# Patient Record
Sex: MALE | Race: OTHER | NOT HISPANIC OR LATINO | URBAN - METROPOLITAN AREA
[De-identification: names, ages, dates, MRNs, and addresses within clinical notes are randomized per-mention and may not be internally consistent; named-entity substitution may affect disease eponyms.]

---

## 2019-02-20 ENCOUNTER — EMERGENCY (EMERGENCY)
Facility: HOSPITAL | Age: 59
LOS: 1 days | Discharge: HOME | End: 2019-02-20
Attending: STUDENT IN AN ORGANIZED HEALTH CARE EDUCATION/TRAINING PROGRAM
Payer: COMMERCIAL

## 2019-02-20 VITALS
OXYGEN SATURATION: 96 % | DIASTOLIC BLOOD PRESSURE: 87 MMHG | HEART RATE: 65 BPM | RESPIRATION RATE: 18 BRPM | TEMPERATURE: 99 F | SYSTOLIC BLOOD PRESSURE: 163 MMHG

## 2019-02-20 VITALS — HEIGHT: 68 IN | WEIGHT: 154.98 LBS

## 2019-02-20 PROCEDURE — 93010 ELECTROCARDIOGRAM REPORT: CPT

## 2019-02-20 PROCEDURE — 99284 EMERGENCY DEPT VISIT MOD MDM: CPT

## 2019-02-20 PROCEDURE — 93005 ELECTROCARDIOGRAM TRACING: CPT

## 2019-02-20 PROCEDURE — 99285 EMERGENCY DEPT VISIT HI MDM: CPT

## 2019-02-20 NOTE — ED PROVIDER NOTE - CLINICAL SUMMARY MEDICAL DECISION MAKING FREE TEXT BOX
Pt brought in to be evaluated for ability to be confined.  Pt is medically fit for confinement at this time and is to continue current medical regimen.

## 2019-02-20 NOTE — ED PROVIDER NOTE - NS ED ROS FT
No fever/chills, No photophobia/eye pain/changes in vision, No ear pain/sore throat/dysphagia, No chest pain/palpitations, no SOB/cough/wheeze/stridor, No abdominal pain, No N/V +diarrhea, no dysuria/frequency/discharge, No neck/back pain, no rash, no changes in neurological status/function.

## 2019-02-20 NOTE — ED ADULT NURSE NOTE - CHIEF COMPLAINT QUOTE
comes to ed in custody with [employer]. patient has pacemaker and needs to be transported to facility in [place]; brought here because he has a pacemaker for fit for confinement. patient has no medical complaints. pt hiv +

## 2019-02-20 NOTE — ED ADULT TRIAGE NOTE - CHIEF COMPLAINT QUOTE
comes to ed in custody with [employer]. patient has pacemaker and needs to be transported to facility in [place]; brought here because he has a pacemaker for fit for confinement. patient has no medical complaints. pt hiv +

## 2019-02-20 NOTE — ED PROVIDER NOTE - OBJECTIVE STATEMENT
Pt is a 57 yo M brought in by [facility] for medical evaluation. PMHx significant for HIV (on medications, last CD4 normal and viral load undetectable), htn, cardiomyopathy and ppm. Pt is to be going to assisted today with the [facility] and was brought in for medical evaluation given his hx of multiple medical problems. Pt has no complaints currently aside from some mild chronic diarrhea which is unchanged. no cp. no sob. no n/v. no other complaints.

## 2019-05-21 ENCOUNTER — APPOINTMENT (OUTPATIENT)
Dept: HEART AND VASCULAR | Facility: CLINIC | Age: 59
End: 2019-05-21

## 2019-08-16 ENCOUNTER — APPOINTMENT (OUTPATIENT)
Dept: HEART AND VASCULAR | Facility: CLINIC | Age: 59
End: 2019-08-16
Payer: COMMERCIAL

## 2019-08-16 ENCOUNTER — APPOINTMENT (OUTPATIENT)
Dept: HEART AND VASCULAR | Facility: CLINIC | Age: 59
End: 2019-08-16

## 2019-08-16 VITALS
HEIGHT: 69 IN | SYSTOLIC BLOOD PRESSURE: 159 MMHG | BODY MASS INDEX: 22.51 KG/M2 | WEIGHT: 152 LBS | DIASTOLIC BLOOD PRESSURE: 83 MMHG | HEART RATE: 57 BPM

## 2019-08-16 DIAGNOSIS — I25.10 ATHEROSCLEROTIC HEART DISEASE OF NATIVE CORONARY ARTERY W/OUT ANGINA PECTORIS: ICD-10-CM

## 2019-08-16 DIAGNOSIS — Z45.02 ENCOUNTER FOR ADJUSTMENT AND MANAGEMENT OF AUTOMATIC IMPLANTABLE CARDIAC DEFIBRILLATOR: ICD-10-CM

## 2019-08-16 PROCEDURE — 93282 PRGRMG EVAL IMPLANTABLE DFB: CPT

## 2019-08-28 RX ORDER — RALTEGRAVIR 400 MG/1
TABLET, FILM COATED ORAL
Refills: 0 | Status: ACTIVE | COMMUNITY

## 2019-08-28 NOTE — PHYSICAL EXAM
[General Appearance - Well Developed] : well developed [Well Groomed] : well groomed [General Appearance - Well Nourished] : well nourished [Heart Rate And Rhythm] : heart rate and rhythm were normal [Heart Sounds] : normal S1 and S2 [Murmurs] : no murmurs present [Arterial Pulses Normal] : the arterial pulses were normal [Edema] : no peripheral edema present [Respiration, Rhythm And Depth] : normal respiratory rhythm and effort [Auscultation Breath Sounds / Voice Sounds] : lungs were clear to auscultation bilaterally [Left Infraclavicular] : left infraclavicular area [Clean] : clean [Dry] : dry [Well-Healed] : well-healed [Bowel Sounds] : normal bowel sounds [Abdomen Soft] : soft [Nail Clubbing] : no clubbing of the fingernails [Cyanosis, Localized] : no localized cyanosis [Normal Conjunctiva] : the conjunctiva exhibited no abnormalities [Normal Oral Mucosa] : normal oral mucosa [Normal Oropharynx] : normal oropharynx [Normal Jugular Venous V Waves Present] : normal jugular venous V waves present [Abnormal Walk] : normal gait [Skin Color & Pigmentation] : normal skin color and pigmentation [Skin Turgor] : normal skin turgor [] : no rash [Oriented To Time, Place, And Person] : oriented to person, place, and time [Impaired Insight] : insight and judgment were intact [No Anxiety] : not feeling anxious

## 2019-08-28 NOTE — PROCEDURE
[ICD] : Implantable cardioverter-defibrillator [VVI] : VVI [Medtronic] : Medtronic [Voltage: ___ volts] : Voltage was [unfilled] volts [Lead Imp:  ___ohms] : lead impedance was [unfilled] ohms [Sensing Amplitude ___mv] : sensing amplitude was [unfilled] mv [___V @] : [unfilled] V [___ ms] : [unfilled] ms [Sense ___ %] : Sense [unfilled]% [de-identified] : Mireya MARY VR [id number] [de-identified] : [id number] [de-identified] : 09/01/2011 [de-identified] : 42 [de-identified] : RRT since June 28th 2019 [de-identified] : NO VT / VF

## 2019-08-28 NOTE — REASON FOR VISIT
[Consultation] : a consultation regarding [AICD Check] : implantable cardioverter-defibrillator [FreeTextEntry1] : ICD evaluation

## 2019-09-09 ENCOUNTER — INPATIENT (INPATIENT)
Facility: HOSPITAL | Age: 59
LOS: 0 days | Discharge: HOME | DRG: 245 | End: 2019-09-10
Attending: INTERNAL MEDICINE | Admitting: INTERNAL MEDICINE
Payer: MEDICAID

## 2019-09-09 VITALS — HEART RATE: 60 BPM | SYSTOLIC BLOOD PRESSURE: 173 MMHG | RESPIRATION RATE: 16 BRPM | DIASTOLIC BLOOD PRESSURE: 90 MMHG

## 2019-09-09 DIAGNOSIS — I25.5 ISCHEMIC CARDIOMYOPATHY: ICD-10-CM

## 2019-09-09 PROCEDURE — 99223 1ST HOSP IP/OBS HIGH 75: CPT

## 2019-09-09 PROCEDURE — 33262 RMVL& REPLC PULSE GEN 1 LEAD: CPT

## 2019-09-09 RX ORDER — ATORVASTATIN CALCIUM 80 MG/1
10 TABLET, FILM COATED ORAL DAILY
Refills: 0 | Status: DISCONTINUED | OUTPATIENT
Start: 2019-09-09 | End: 2019-09-10

## 2019-09-09 RX ORDER — DARUNAVIR ETHANOLATE AND COBICISTAT 800; 150 MG/1; MG/1
1 TABLET, FILM COATED ORAL
Qty: 0 | Refills: 0 | DISCHARGE

## 2019-09-09 RX ORDER — PRASUGREL 5 MG/1
10 TABLET, FILM COATED ORAL DAILY
Refills: 0 | Status: DISCONTINUED | OUTPATIENT
Start: 2019-09-09 | End: 2019-09-10

## 2019-09-09 RX ORDER — ATORVASTATIN CALCIUM 80 MG/1
1 TABLET, FILM COATED ORAL
Qty: 0 | Refills: 0 | DISCHARGE

## 2019-09-09 RX ORDER — RALTEGRAVIR 400 MG/1
1 TABLET, FILM COATED ORAL
Qty: 0 | Refills: 0 | DISCHARGE

## 2019-09-09 RX ORDER — ETRAVIRINE 200 MG/1
200 TABLET ORAL
Refills: 0 | Status: DISCONTINUED | OUTPATIENT
Start: 2019-09-09 | End: 2019-09-10

## 2019-09-09 RX ORDER — ETRAVIRINE 200 MG/1
1 TABLET ORAL
Qty: 0 | Refills: 0 | DISCHARGE

## 2019-09-09 RX ORDER — SPIRONOLACTONE 25 MG/1
1 TABLET, FILM COATED ORAL
Qty: 0 | Refills: 0 | DISCHARGE

## 2019-09-09 RX ORDER — LOPERAMIDE HCL 2 MG
1 TABLET ORAL
Qty: 0 | Refills: 0 | DISCHARGE

## 2019-09-09 RX ORDER — SPIRONOLACTONE 25 MG/1
25 TABLET, FILM COATED ORAL DAILY
Refills: 0 | Status: DISCONTINUED | OUTPATIENT
Start: 2019-09-09 | End: 2019-09-10

## 2019-09-09 RX ORDER — CEFAZOLIN SODIUM 1 G
2000 VIAL (EA) INJECTION ONCE
Refills: 0 | Status: COMPLETED | OUTPATIENT
Start: 2019-09-09 | End: 2019-09-09

## 2019-09-09 RX ORDER — LISINOPRIL 2.5 MG/1
5 TABLET ORAL DAILY
Refills: 0 | Status: DISCONTINUED | OUTPATIENT
Start: 2019-09-09 | End: 2019-09-10

## 2019-09-09 RX ORDER — LOPERAMIDE HCL 2 MG
2 TABLET ORAL DAILY
Refills: 0 | Status: DISCONTINUED | OUTPATIENT
Start: 2019-09-09 | End: 2019-09-10

## 2019-09-09 RX ORDER — CEFAZOLIN SODIUM 1 G
2000 VIAL (EA) INJECTION EVERY 8 HOURS
Refills: 0 | Status: DISCONTINUED | OUTPATIENT
Start: 2019-09-09 | End: 2019-09-09

## 2019-09-09 RX ORDER — DARUNAVIR ETHANOLATE AND COBICISTAT 800; 150 MG/1; MG/1
1 TABLET, FILM COATED ORAL DAILY
Refills: 0 | Status: DISCONTINUED | OUTPATIENT
Start: 2019-09-09 | End: 2019-09-10

## 2019-09-09 RX ORDER — PRASUGREL 5 MG/1
1 TABLET, FILM COATED ORAL
Qty: 0 | Refills: 0 | DISCHARGE

## 2019-09-09 RX ORDER — RALTEGRAVIR 400 MG/1
400 TABLET, FILM COATED ORAL
Refills: 0 | Status: DISCONTINUED | OUTPATIENT
Start: 2019-09-09 | End: 2019-09-10

## 2019-09-09 RX ORDER — METOPROLOL TARTRATE 50 MG
25 TABLET ORAL
Refills: 0 | Status: DISCONTINUED | OUTPATIENT
Start: 2019-09-09 | End: 2019-09-10

## 2019-09-09 RX ORDER — CEFAZOLIN SODIUM 1 G
2000 VIAL (EA) INJECTION ONCE
Refills: 0 | Status: DISCONTINUED | OUTPATIENT
Start: 2019-09-09 | End: 2019-09-09

## 2019-09-09 RX ORDER — CEFAZOLIN SODIUM 1 G
2000 VIAL (EA) INJECTION EVERY 8 HOURS
Refills: 0 | Status: COMPLETED | OUTPATIENT
Start: 2019-09-09 | End: 2019-09-10

## 2019-09-09 RX ADMIN — ETRAVIRINE 200 MILLIGRAM(S): 200 TABLET ORAL at 19:18

## 2019-09-09 RX ADMIN — RALTEGRAVIR 400 MILLIGRAM(S): 400 TABLET, FILM COATED ORAL at 19:19

## 2019-09-09 RX ADMIN — ATORVASTATIN CALCIUM 10 MILLIGRAM(S): 80 TABLET, FILM COATED ORAL at 22:12

## 2019-09-09 RX ADMIN — SPIRONOLACTONE 25 MILLIGRAM(S): 25 TABLET, FILM COATED ORAL at 19:19

## 2019-09-09 RX ADMIN — LISINOPRIL 5 MILLIGRAM(S): 2.5 TABLET ORAL at 19:19

## 2019-09-09 RX ADMIN — PRASUGREL 10 MILLIGRAM(S): 5 TABLET, FILM COATED ORAL at 19:19

## 2019-09-09 RX ADMIN — Medication 2000 MILLIGRAM(S): at 13:29

## 2019-09-09 RX ADMIN — Medication 2000 MILLIGRAM(S): at 22:12

## 2019-09-09 RX ADMIN — Medication 25 MILLIGRAM(S): at 19:19

## 2019-09-09 RX ADMIN — DARUNAVIR ETHANOLATE AND COBICISTAT 1 TABLET(S): 800; 150 TABLET, FILM COATED ORAL at 19:18

## 2019-09-09 RX ADMIN — Medication 2 MILLIGRAM(S): at 19:19

## 2019-09-09 NOTE — PROGRESS NOTE ADULT - SUBJECTIVE AND OBJECTIVE BOX
BRIAN BUCIO  [id number]    PROCEDURE:  ICD Generator change          INDICATION:  Generator battery depletion        ELECTROPHYSIOLOGIST(S):  MD Riky Jordan MD      FINDINGS:  - Successful ICD generator change      COMPLICATIONS:  None      RECOMMENDATIONS:  - Monitor overnight for hematoma  - Restart home meds

## 2019-09-09 NOTE — H&P ADULT - HISTORY OF PRESENT ILLNESS
59 year old man with history of HIV (1987 with no opportunistic infections), hypertension, CAD s/p MI with stents and history of thrombosis and CABG, hyperlipidemia and ischemic cardiomyopathy s/p ICD at [facility], who presents for elective generator change.      In March of 2011 he had chest pain and an evaluation proved significant obstructive coronary artery disease. He received a stent to a 98% RCA lesion. Approximately 1 year later symptoms recurred and he underwent repeat coronary angiography during which the decision was made to place a drug eluting stent to the LAD. 6 days after this procedure he presented with chest pain and an acute MI. Emergent coronary angiography confirmed stent thrombosis. He received an IABP and was transferred to [place] where he underwent urgent coronary artery bypass grafting (3 vessels). Post operative course was uneventful and he was discharge 6 days after.     He had persistent EF in the 30% range and underwent ICD implantation for primary prevention of SCD in 9/ 2011. He is presently incarcerated. Device at [facility] and he presents for elective generator change.  No device related complaints.  No syncope, chest pain, palpitations, SOB.

## 2019-09-09 NOTE — PATIENT PROFILE ADULT - NSASFUNCLEVELADLTOILET_GEN_A_NUR
LOV 4/17. Per notes Levothyroxine 150mcg. eGFR (5/17) 58.Has upcoming appt w/ PCP. Will send 3 months of fills to pharmacy.   0 = independent

## 2019-09-09 NOTE — H&P ADULT - ASSESSMENT
59 year old man with history of HIV (1987 with no opportunistic infections), hypertension, CAD s/p MI with stents and history of thrombosis and CABG, hyperlipidemia and ischemic cardiomyopathy s/p ICD at [facility] who presents for elective generator change.

## 2019-09-09 NOTE — H&P ADULT - NSICDXPASTSURGICALHX_GEN_ALL_CORE_FT
PAST SURGICAL HISTORY:  Coronary Bypass     Inguinal Hernia Unilateral, Non-Recurrent Repair * yrs ago

## 2019-09-09 NOTE — H&P ADULT - OPHTHALMOLOGIC
Spoke to Leslie Shepherd, pt's friend. States she will pick pt up from the ED. Pt informed if ride does not show up she has to stay 4 hrs after medication administration. Pt verbalized understanding.   negative

## 2019-09-10 ENCOUNTER — TRANSCRIPTION ENCOUNTER (OUTPATIENT)
Age: 59
End: 2019-09-10

## 2019-09-10 VITALS — TEMPERATURE: 98 F

## 2019-09-10 PROCEDURE — 99239 HOSP IP/OBS DSCHRG MGMT >30: CPT

## 2019-09-10 RX ORDER — METOPROLOL TARTRATE 50 MG
1 TABLET ORAL
Qty: 30 | Refills: 0
Start: 2019-09-10 | End: 2019-10-09

## 2019-09-10 RX ORDER — CEPHALEXIN 500 MG
1 CAPSULE ORAL
Qty: 10 | Refills: 0
Start: 2019-09-10 | End: 2019-09-14

## 2019-09-10 RX ORDER — LISINOPRIL 2.5 MG/1
1 TABLET ORAL
Qty: 0 | Refills: 0 | DISCHARGE
Start: 2019-09-10

## 2019-09-10 RX ORDER — METOPROLOL TARTRATE 50 MG
1 TABLET ORAL
Qty: 0 | Refills: 0 | DISCHARGE

## 2019-09-10 RX ORDER — LISINOPRIL 2.5 MG/1
1 TABLET ORAL
Qty: 0 | Refills: 0 | DISCHARGE

## 2019-09-10 RX ORDER — METOPROLOL TARTRATE 50 MG
1 TABLET ORAL
Qty: 30 | Refills: 3
Start: 2019-09-10 | End: 2020-01-07

## 2019-09-10 RX ADMIN — RALTEGRAVIR 400 MILLIGRAM(S): 400 TABLET, FILM COATED ORAL at 06:27

## 2019-09-10 RX ADMIN — PRASUGREL 10 MILLIGRAM(S): 5 TABLET, FILM COATED ORAL at 12:59

## 2019-09-10 RX ADMIN — DARUNAVIR ETHANOLATE AND COBICISTAT 1 TABLET(S): 800; 150 TABLET, FILM COATED ORAL at 12:59

## 2019-09-10 RX ADMIN — Medication 25 MILLIGRAM(S): at 06:26

## 2019-09-10 RX ADMIN — ATORVASTATIN CALCIUM 10 MILLIGRAM(S): 80 TABLET, FILM COATED ORAL at 12:59

## 2019-09-10 RX ADMIN — ETRAVIRINE 200 MILLIGRAM(S): 200 TABLET ORAL at 06:44

## 2019-09-10 RX ADMIN — Medication 2000 MILLIGRAM(S): at 06:27

## 2019-09-10 NOTE — DISCHARGE NOTE NURSING/CASE MANAGEMENT/SOCIAL WORK - PATIENT PORTAL LINK FT
You can access the FollowMyHealth Patient Portal offered by [facility] by registering at the following website: [email or web address]. By joining [facility]’s FollowMyHealth portal, you will also be able to view your health information using other applications (apps) compatible with our system.

## 2019-09-10 NOTE — DISCHARGE NOTE PROVIDER - HOSPITAL COURSE
59 year old man with history of HIV (1987 with no opportunistic infections), hypertension, CAD s/p MI with stents and history of thrombosis and CABG, hyperlipidemia and ischemic cardiomyopathy s/p ICD at [facility], who presents for elective generator change.          He had an elective generator change.  Device located in the subpectoral region and given effient there was some bleeding - he stayed overnight and had a pressure dressing that was removed.  Lisinopril increased for better blood pressure control - vital signs stable.  Tele NSR with 5 beats NSVT.  Medications reconciled and follow up appointment made.  He is stable for discahrge. 59 year old man with history of HIV (1987 with no opportunistic infections), hypertension, CAD s/p MI with stents and history of thrombosis and CABG, hyperlipidemia and ischemic cardiomyopathy s/p ICD at [facility], who presents for elective generator change.          He had an elective generator change.  Device located in the subpectoral region and given effient there was some bleeding - he stayed overnight and had a pressure dressing that was removed.  Lisinopril increased for better blood pressure control - vital signs stable.  Tele NSR with 5 beats NSVT.  Medications reconciled and follow up appointment made.  He is stable for discahrge.  a

## 2019-09-10 NOTE — DISCHARGE NOTE PROVIDER - CARE PROVIDER_API CALL
Luca Sheffield (MD)  Cardiac Electrophysiology; Cardiovascular Disease  [address] [place] [address]  Phone: [phone]  Fax: [phone]  Follow Up Time:

## 2019-09-10 NOTE — DISCHARGE NOTE PROVIDER - NSDCCPCAREPLAN_GEN_ALL_CORE_FT
PRINCIPAL DISCHARGE DIAGNOSIS  Diagnosis: Ischemic cardiomyopathy  Assessment and Plan of Treatment:

## 2019-09-10 NOTE — DISCHARGE NOTE PROVIDER - NSDCFUADDINST_GEN_ALL_CORE_FT
No heavy lifting for one week.  No pushups for one month.  Any bleeding, hardness at procedure site or discharge or any fever/chills or issues please call [phone]

## 2019-09-10 NOTE — DISCHARGE NOTE PROVIDER - NSDCCPTREATMENT_GEN_ALL_CORE_FT
PRINCIPAL PROCEDURE  Procedure: Replacement, pulse generator, ICD, single lead  Findings and Treatment:

## 2019-09-17 DIAGNOSIS — I10 ESSENTIAL (PRIMARY) HYPERTENSION: ICD-10-CM

## 2019-09-17 DIAGNOSIS — Z45.02 ENCOUNTER FOR ADJUSTMENT AND MANAGEMENT OF AUTOMATIC IMPLANTABLE CARDIAC DEFIBRILLATOR: ICD-10-CM

## 2019-09-17 DIAGNOSIS — I25.10 ATHEROSCLEROTIC HEART DISEASE OF NATIVE CORONARY ARTERY WITHOUT ANGINA PECTORIS: ICD-10-CM

## 2019-09-17 DIAGNOSIS — Z95.810 PRESENCE OF AUTOMATIC (IMPLANTABLE) CARDIAC DEFIBRILLATOR: ICD-10-CM

## 2019-09-17 DIAGNOSIS — Z21 ASYMPTOMATIC HUMAN IMMUNODEFICIENCY VIRUS [HIV] INFECTION STATUS: ICD-10-CM

## 2019-09-17 DIAGNOSIS — I25.5 ISCHEMIC CARDIOMYOPATHY: ICD-10-CM

## 2019-09-17 DIAGNOSIS — Z95.1 PRESENCE OF AORTOCORONARY BYPASS GRAFT: ICD-10-CM

## 2019-09-17 DIAGNOSIS — Z95.5 PRESENCE OF CORONARY ANGIOPLASTY IMPLANT AND GRAFT: ICD-10-CM

## 2019-09-17 DIAGNOSIS — E78.5 HYPERLIPIDEMIA, UNSPECIFIED: ICD-10-CM

## 2019-09-17 DIAGNOSIS — I25.2 OLD MYOCARDIAL INFARCTION: ICD-10-CM

## 2019-10-08 PROCEDURE — C1722: CPT

## 2019-11-12 ENCOUNTER — APPOINTMENT (OUTPATIENT)
Dept: HEART AND VASCULAR | Facility: CLINIC | Age: 59
End: 2019-11-12

## 2019-11-19 PROBLEM — B20 HUMAN IMMUNODEFICIENCY VIRUS [HIV] DISEASE: Chronic | Status: ACTIVE | Noted: 2019-09-09

## 2019-11-19 PROBLEM — I25.5 ISCHEMIC CARDIOMYOPATHY: Chronic | Status: ACTIVE | Noted: 2019-09-09

## 2019-12-10 ENCOUNTER — APPOINTMENT (OUTPATIENT)
Dept: HEART AND VASCULAR | Facility: CLINIC | Age: 59
End: 2019-12-10
Payer: COMMERCIAL

## 2019-12-10 VITALS
BODY MASS INDEX: 22.96 KG/M2 | HEART RATE: 59 BPM | HEIGHT: 69 IN | SYSTOLIC BLOOD PRESSURE: 178 MMHG | WEIGHT: 155 LBS | DIASTOLIC BLOOD PRESSURE: 98 MMHG

## 2019-12-10 PROCEDURE — 93280 PM DEVICE PROGR EVAL DUAL: CPT

## 2019-12-10 PROCEDURE — 99214 OFFICE O/P EST MOD 30 MIN: CPT | Mod: 25

## 2019-12-10 RX ORDER — SPIRONOLACTONE 25 MG/1
25 TABLET ORAL
Refills: 0 | Status: DISCONTINUED | COMMUNITY
End: 2019-12-10

## 2019-12-10 RX ORDER — LOPERAMIDE HYDROCHLORIDE 2 MG/1
2 CAPSULE ORAL
Refills: 0 | Status: DISCONTINUED | COMMUNITY
End: 2019-12-10

## 2019-12-10 RX ORDER — METOPROLOL SUCCINATE 25 MG/1
25 TABLET, EXTENDED RELEASE ORAL
Refills: 0 | Status: DISCONTINUED | COMMUNITY
End: 2019-12-10

## 2019-12-10 RX ORDER — METOPROLOL SUCCINATE 50 MG/1
50 TABLET, EXTENDED RELEASE ORAL
Refills: 0 | Status: ACTIVE | COMMUNITY

## 2019-12-10 RX ORDER — PRASUGREL 10 MG/1
10 TABLET, FILM COATED ORAL DAILY
Refills: 0 | Status: ACTIVE | COMMUNITY

## 2019-12-10 NOTE — REASON FOR VISIT
[AICD Check] : implantable cardioverter-defibrillator [Follow-up Device Check] : follow-up device check visit [Follow-Up - Clinic] : a clinic follow-up of [FreeTextEntry1] : ICD evaluation

## 2019-12-10 NOTE — PROCEDURE
[Medtronic] : Medtronic [ICD] : Implantable cardioverter-defibrillator [Voltage: ___ volts] : Voltage was [unfilled] volts [VVI] : VVI [Lead Imp:  ___ohms] : lead impedance was [unfilled] ohms [Sensing Amplitude ___mv] : sensing amplitude was [unfilled] mv [___V @] : [unfilled] V [___ ms] : [unfilled] ms [Sense ___ %] : Sense [unfilled]% [Normal] : The battery status is normal. [de-identified] : MEDTRONIC [de-identified] : 9/9/19 [de-identified] : 42 [de-identified] : NO VT / VF

## 2019-12-10 NOTE — PHYSICAL EXAM
[General Appearance - Well Developed] : well developed [Well Groomed] : well groomed [Heart Rate And Rhythm] : heart rate and rhythm were normal [General Appearance - Well Nourished] : well nourished [Heart Sounds] : normal S1 and S2 [Arterial Pulses Normal] : the arterial pulses were normal [Murmurs] : no murmurs present [Edema] : no peripheral edema present [Respiration, Rhythm And Depth] : normal respiratory rhythm and effort [Auscultation Breath Sounds / Voice Sounds] : lungs were clear to auscultation bilaterally [Left Infraclavicular] : left infraclavicular area [Clean] : clean [Dry] : dry [Well-Healed] : well-healed [Bowel Sounds] : normal bowel sounds [Abdomen Soft] : soft [Nail Clubbing] : no clubbing of the fingernails [Cyanosis, Localized] : no localized cyanosis [Normal Conjunctiva] : the conjunctiva exhibited no abnormalities [Normal Oral Mucosa] : normal oral mucosa [Normal Oropharynx] : normal oropharynx [Normal Jugular Venous V Waves Present] : normal jugular venous V waves present [Abnormal Walk] : normal gait [Skin Color & Pigmentation] : normal skin color and pigmentation [Skin Turgor] : normal skin turgor [] : no rash [Oriented To Time, Place, And Person] : oriented to person, place, and time [Impaired Insight] : insight and judgment were intact [No Anxiety] : not feeling anxious

## 2021-09-28 ENCOUNTER — APPOINTMENT (OUTPATIENT)
Dept: HEART AND VASCULAR | Facility: CLINIC | Age: 61
End: 2021-09-28
Payer: COMMERCIAL

## 2021-09-28 VITALS
WEIGHT: 150 LBS | DIASTOLIC BLOOD PRESSURE: 96 MMHG | HEART RATE: 54 BPM | TEMPERATURE: 97 F | BODY MASS INDEX: 22.22 KG/M2 | HEIGHT: 69 IN | SYSTOLIC BLOOD PRESSURE: 173 MMHG

## 2021-09-28 DIAGNOSIS — I25.5 ISCHEMIC CARDIOMYOPATHY: ICD-10-CM

## 2021-09-28 DIAGNOSIS — Z95.810 PRESENCE OF AUTOMATIC (IMPLANTABLE) CARDIAC DEFIBRILLATOR: ICD-10-CM

## 2021-09-28 DIAGNOSIS — I10 ESSENTIAL (PRIMARY) HYPERTENSION: ICD-10-CM

## 2021-09-28 PROCEDURE — 93282 PRGRMG EVAL IMPLANTABLE DFB: CPT

## 2021-09-28 PROCEDURE — 99072 ADDL SUPL MATRL&STAF TM PHE: CPT

## 2021-09-28 RX ORDER — ASPIRIN 81 MG
81 TABLET, DELAYED RELEASE (ENTERIC COATED) ORAL
Refills: 0 | Status: ACTIVE | COMMUNITY

## 2021-09-28 RX ORDER — LOPERAMIDE HYDROCHLORIDE 2 MG/1
2 CAPSULE ORAL
Refills: 0 | Status: ACTIVE | COMMUNITY

## 2021-09-28 RX ORDER — ATORVASTATIN CALCIUM 20 MG/1
20 TABLET, FILM COATED ORAL
Refills: 0 | Status: ACTIVE | COMMUNITY

## 2021-09-28 RX ORDER — DARUNAVIR ETHANOLATE AND COBICISTAT 800; 150 MG/1; MG/1
TABLET, FILM COATED ORAL
Refills: 0 | Status: ACTIVE | COMMUNITY

## 2021-09-28 RX ORDER — LISINOPRIL 20 MG/1
20 TABLET ORAL
Refills: 0 | Status: ACTIVE | COMMUNITY

## 2021-09-28 RX ORDER — AMLODIPINE BESYLATE 2.5 MG/1
2.5 TABLET ORAL
Refills: 0 | Status: ACTIVE | COMMUNITY

## 2021-09-28 RX ORDER — ISOSORBIDE MONONITRATE 30 MG/1
30 TABLET, EXTENDED RELEASE ORAL
Refills: 0 | Status: ACTIVE | COMMUNITY

## 2021-09-28 NOTE — REASON FOR VISIT
[Follow-Up - Clinic] : a clinic follow-up of [AICD Check] : implantable cardioverter-defibrillator [FreeTextEntry1] : ICD evaluation

## 2021-09-28 NOTE — PROCEDURE
[ICD] : Implantable cardioverter-defibrillator [Medtronic] : Medtronic [VVI] : VVI [Voltage: ___ volts] : Voltage was [unfilled] volts [Normal] : The battery status is normal. [Lead Imp:  ___ohms] : lead impedance was [unfilled] ohms [Sensing Amplitude ___mv] : sensing amplitude was [unfilled] mv [___V @] : [unfilled] V [___ ms] : [unfilled] ms [Sense ___ %] : Sense [unfilled]% [de-identified] : MEDTRONIC [de-identified] : 9/9/19 [de-identified] : 42 [de-identified] : 11 years to [facility]LE[facility] [de-identified] : No events

## 2022-03-01 ENCOUNTER — APPOINTMENT (OUTPATIENT)
Dept: HEART AND VASCULAR | Facility: CLINIC | Age: 62
End: 2022-03-01

## 2023-10-01 PROBLEM — Z45.02 IMPLANTABLE CARDIOVERTER-DEFIBRILLATOR (ICD) GENERATOR END OF LIFE: Status: ACTIVE | Noted: 2019-08-28
